# Patient Record
(demographics unavailable — no encounter records)

---

## 2024-10-09 NOTE — ASSESSMENT
[FreeTextEntry1] :  Mr. Palomino, a 62-year-old gentleman who presents with erectile dysfunction, possibly due to a combination of factos including : 1 ? of  low testosterone levels 2. .  high blood pressure 3. beta blockers 4 situational factors (wife not interested in sexual intercourse for years)  He had taken PD5 inhibitors (tadalafil) in past with good response. He has not taken again  He also had mild LUTS  Nocturia and mild frequency  He has not been treated for this He has an excellent flow and PVR  He has posthitis We discussed hygiene and nystatin/triamcinolone for short time    - Problems:     - Erectile dysfunction     - Hypertension    - LUTS    - Posthitis    Plan:  WE discussed the utilization of PD5-I and possible side effects, onset of action, duration of action, and food interactions.  Discussed behavioral strategies to optimize efficacy of medication.   Discussed the potential advantages and disadvantages as well as side effect profiles of each. Warned re priapism and need for intervention to prevent permanent penile injury, Will proceed with Sildenafil 100 mg ( will start at 50)   We reviewed his LUTS and flow and PVR He does not need intervention or treatment at this time  We discussed his mild posthitis. Advise him to keep the area around the penis well-dried and clean Will treat with nystatin/triamcinolone  Plan  testosterone; prolactin; estradiol  nystatin triamcinolone sildenafil 100 mg The KEELEY PALOMINO  expressed fully understanding of the information provided, the consequences and the management.     - Follow-up appointment scheduled in three weeks for reevaluation/assessment of response

## 2024-10-09 NOTE — HISTORY OF PRESENT ILLNESS
[FreeTextEntry1] : : Mr. Fernie Yo is a 62 year old  who presents  with erectile dysfunction. He states he has  difficulty in maintaining an erection for several years. Mr. Yo has been dealing with erectile dysfunction for an unspecified number of years,  He can achieve but does not maintain an erection sufficiently for intercourse. He notes better erections with masturbation His wife does not seem interested in sexual function.  He reports "experimenting" with over-the-counter medications and has attempted cialis (from friend)  which he believes was effective However, he has not taken it recently.  He states his primary care provider initiated  a course of testosterone injections due to diagnosed low testosterone levels, but Mr. Yo discontinued due to difficulty with self-administration.  He does not have these results.  He is unclear if this improved his sexual function  He has hypertension for which he takes Bisoprolol.  He  reports a history of smoking and moderate alcohol consumption.     - Past Surgical History: The patient underwent surgery for his left Achilles and the right shoulder and has received a couple of epidural injections on his lower back and neck.   - Family History: The patient's father had issues with his prostate. No prostate cancer   - Social History: Mr. Yo works in [employer] which involves significant amounts of sitting. He is  for the second time, with a 36-year-old son from his first marriage. He used to smoke in the past, but not heavily, and drinks alcohol occasionally. He does not engage in regular physical activities.   - Review of Systems: The patient exhibits signs of hypertension and erectile dysfunction. otherwise negative   - Medications: Bisoprolol 10mg   - Allergies: penicillin